# Patient Record
Sex: MALE
[De-identification: names, ages, dates, MRNs, and addresses within clinical notes are randomized per-mention and may not be internally consistent; named-entity substitution may affect disease eponyms.]

---

## 2023-11-13 ENCOUNTER — APPOINTMENT (OUTPATIENT)
Dept: UROLOGY | Facility: CLINIC | Age: 43
End: 2023-11-13
Payer: COMMERCIAL

## 2023-11-13 VITALS
HEIGHT: 71 IN | HEART RATE: 67 BPM | DIASTOLIC BLOOD PRESSURE: 70 MMHG | BODY MASS INDEX: 29.42 KG/M2 | WEIGHT: 210.13 LBS | SYSTOLIC BLOOD PRESSURE: 117 MMHG | TEMPERATURE: 97 F

## 2023-11-13 DIAGNOSIS — Z78.9 OTHER SPECIFIED HEALTH STATUS: ICD-10-CM

## 2023-11-13 DIAGNOSIS — Z80.0 FAMILY HISTORY OF MALIGNANT NEOPLASM OF DIGESTIVE ORGANS: ICD-10-CM

## 2023-11-13 DIAGNOSIS — Z83.3 FAMILY HISTORY OF DIABETES MELLITUS: ICD-10-CM

## 2023-11-13 DIAGNOSIS — Z98.890 OTHER SPECIFIED POSTPROCEDURAL STATES: ICD-10-CM

## 2023-11-13 PROBLEM — Z00.00 ENCOUNTER FOR PREVENTIVE HEALTH EXAMINATION: Status: ACTIVE | Noted: 2023-11-13

## 2023-11-13 PROCEDURE — 99204 OFFICE O/P NEW MOD 45 MIN: CPT

## 2023-12-20 ENCOUNTER — APPOINTMENT (OUTPATIENT)
Dept: UROLOGY | Facility: CLINIC | Age: 43
End: 2023-12-20
Payer: COMMERCIAL

## 2023-12-20 VITALS
WEIGHT: 201 LBS | BODY MASS INDEX: 28.14 KG/M2 | HEIGHT: 71 IN | HEART RATE: 70 BPM | SYSTOLIC BLOOD PRESSURE: 103 MMHG | DIASTOLIC BLOOD PRESSURE: 60 MMHG

## 2023-12-20 PROCEDURE — 99214 OFFICE O/P EST MOD 30 MIN: CPT

## 2023-12-20 NOTE — PHYSICAL EXAM
[General Appearance - Well Developed] : well developed [General Appearance - Well Nourished] : well nourished [Normal Appearance] : normal appearance [Well Groomed] : well groomed [General Appearance - In No Acute Distress] : no acute distress [Edema] : no peripheral edema [Respiration, Rhythm And Depth] : normal respiratory rhythm and effort [Exaggerated Use Of Accessory Muscles For Inspiration] : no accessory muscle use [Auscultation Breath Sounds / Voice Sounds] : lungs were clear to auscultation bilaterally [Abdomen Soft] : soft [Abdomen Tenderness] : non-tender [Costovertebral Angle Tenderness] : no ~M costovertebral angle tenderness [Normal Station and Gait] : the gait and station were normal for the patient's age [] : no rash [No Focal Deficits] : no focal deficits [Oriented To Time, Place, And Person] : oriented to person, place, and time [Affect] : the affect was normal [Mood] : the mood was normal [Not Anxious] : not anxious [de-identified] : Fields of View were normal

## 2023-12-20 NOTE — ASSESSMENT
[FreeTextEntry1] : His numbers are normal on the Clomid and Crestor that includes liver function tests though they did not run the lipids so I can get that answer and we just can give him another slip to get lipids.  There is a problem we will let him know  As far as fertility I do not have the full semen analysis but it was enough for intrauterine insemination and we have to wait and see if it worked.  If it did not yet be up to Dr. Alegria as to how many cycles of IUI they do before going to IVF.  I do not think there is anything much that I can do with this and to make his sperm be even more improved and the timeframe that they probably have if at all.  To summarize then He will stay on the Clomid 25 mg a day he has enough for now He will get blood test within the next couple of days to check his lipids He will get bloods in 2-1/2 and see me in 3 months Even if she gets pregnant I am recommending he not stop the Clomid.

## 2023-12-20 NOTE — LETTER BODY
[Dear  ___] : Dear  [unfilled], [Consult Letter:] : I had the pleasure of evaluating your patient, [unfilled]. [Please see my note below.] : Please see my note below. [Consult Closing:] : Thank you very much for allowing me to participate in the care of this patient.  If you have any questions, please do not hesitate to contact me. [Sincerely,] : Sincerely, [FreeTextEntry2] : Jose C Alegria MD [address] [place] [address]

## 2023-12-20 NOTE — HISTORY OF PRESENT ILLNESS
[None] : no symptoms [FreeTextEntry1] : Renzo is a 43-year-old male born April 28, 1980who I initially saw on November 13, 2023.  He gave me a history has been with Arelis a 40-year-old female born [birth year] 1983 for 1-1/2 years.  They have never used birth control and unfortunately, though she conceived, they had a miscarriage in January at 9 weeks of age.  Arelis had a follow-up partner from whom she had a pregnancy and her daughter is now 15 years old.  Renzo has not made any other women pregnant despite having prior partners without the use of birth control.  He has no prior "anatomical" urological history though about 10 years ago he was told he had a low testosterone was put on Clomid the testosterone normalized though the only numbers he remembers are the total testosterones.  There was some mornings he woke up not feeling his best so he decided to stop the Clomid.  When they tried to conceive and could not he thought Clomid might be a good idea because he read about it on the Internet so he restarted himself on Clomid ranging between 50 and 25 mg sometimes take it back-to-back sometimes every 3 to 4 days sometimes forgetting.  He saw a DrDoris  when he was in [place] who put him on 25 mg/day and he has been fairly consistent with that for about anywhere from 2 to 6 weeks depending on whom you ask.  He has not had repeat bloods done on a steady dose.  He did have lipid testing back in May which was read as elevated lipids and he was put back on Crestor and tells me he has been taking 20 mg/day for 6 months but no one checked his liver enzymes or his lipids on the medication.  I have 1 semen analysis from June 7, 2023 when he was not on the Clomid on a regular basis pH was 8 Concentration was 19 mg/mL Total sperm was 38 million Motility was 37% Forward progression was 2 Normal morphology was 28% with the abnormal forms having 4% tapered He says he has no trouble with sexual performance he can obtain and maintain an erection whenever he and his wife are in the mood  Please note shse has seen Dr. Alegria who reportedly said that her ovarian reserve seems to be intact but given her age they are not going to wait too long before going to assisted reproductive technology I did discuss with them the consideration of chromosomal abnormalities as women are over 35 and then even more over 40 and the question of which ART to use IUI versus IVF with preimplantation genetic diagnosis this is something she will discuss with him.  At the last visit his exam was acceptable.  We reviewed several issues We do not have any blood test with him off Clomid I only have 1 semen analysis so I want at least 1 more 3 months since the last 1 Arelis is 40 and though she is an "young 41" she still was 40 years old and there is always the increased risk of chromosomal abnormalities  We decided to get tests with him on the Clomid 25 mg every day for at least 3 weeks Arelis said he has been on it for 2 he said it has been on it for 6 so we just decided to wait another week and get it We will also check his liver function and his cholesterol because he has been on a statin for 6 months and no one has checked it Arelis will speak to Dr. Alegria and discuss ART  They also aware no matter what we do anything that we will change sperm usually takes 3 to 6 months to work and it is up to Dr. Alegria whether they have that time.  Finally whether they have any children he has a low testosterone needs to be treated be with Clomid or exogenous testosterone because of all the deleterious effects on your health of low testosterone  Blood test were done November 20, 2023 with him on Clomid 25 mg/day Hemoglobin was 14.7 with a platelet count of 289,000 Total testosterone was 399 Bioavailable testosterone was 223 LH was 4.2 FSH was 5.1 Estradiol was 21.8 Sex hormone binding globulin was 19  Dr. Alegria decided they should move with ART and they did an IUI and today she has only been 8 days so I have no answer.  Renzo tells me he thinks there was 16 million sperm but it does not the rest of the parameters.

## 2023-12-20 NOTE — LETTER HEADER
[FreeTextEntry3] : Simone Presley M.D. [address] [place] [address], [address] [place] [address]

## 2024-02-06 ENCOUNTER — APPOINTMENT (OUTPATIENT)
Dept: UROLOGY | Facility: CLINIC | Age: 44
End: 2024-02-06
Payer: COMMERCIAL

## 2024-02-06 PROCEDURE — G2211 COMPLEX E/M VISIT ADD ON: CPT | Mod: NC,1L

## 2024-02-06 PROCEDURE — 99215 OFFICE O/P EST HI 40 MIN: CPT

## 2024-02-06 NOTE — PHYSICAL EXAM
[General Appearance - Well Developed] : well developed [General Appearance - Well Nourished] : well nourished [Normal Appearance] : normal appearance [Well Groomed] : well groomed [General Appearance - In No Acute Distress] : no acute distress [Heart Rate And Rhythm] : heart rate and rhythm were normal [Edema] : no peripheral edema [Respiration, Rhythm And Depth] : normal respiratory rhythm and effort [Exaggerated Use Of Accessory Muscles For Inspiration] : no accessory muscle use [Auscultation Breath Sounds / Voice Sounds] : lungs were clear to auscultation bilaterally [Abdomen Soft] : soft [Abdomen Tenderness] : non-tender [Costovertebral Angle Tenderness] : no ~M costovertebral angle tenderness [Urethral Meatus] : meatus normal [Penis Abnormality] : normal circumcised penis [Testes Tenderness] : no tenderness of the testes [Normal Station and Gait] : the gait and station were normal for the patient's age [] : no rash [No Focal Deficits] : no focal deficits [Oriented To Time, Place, And Person] : oriented to person, place, and time [Affect] : the affect was normal [Mood] : the mood was normal [Not Anxious] : not anxious [Inguinal Lymph Nodes Enlarged Bilaterally] : inguinal [FreeTextEntry1] : bmi = 28 [de-identified] : leonard [de-identified] : Fields of View were normal

## 2024-02-06 NOTE — ASSESSMENT
[FreeTextEntry1] : His hormones are good his lipid support and he has a new onset right inguinal hernia that feels like bowel and was tender for almost 2 weeks  I discussed with him the possible emergencies of incarcerated hernia.  He is going to [place] tomorrow but there are doctors down in [place].  When he comes back I would ask him to see Dr. Rivera and he probably should make the appointment before he leaves.  As far as the Clomid he is doing well on it, I think I am going to keep him on it and instead of seeing him in the near future we will just get repeat bloods in early April and see him then.  As far as fertility he still has not gotten the semen analysis unfortunately IUI did not work and try again when he comes back and he will get me both semen analysis in writing.  To summarize bloods and April see me after the bloods Consult to Dr. Rivera Get me the semen analysis reports Continue the Clomid 25 mg/day that the fact that it is an off label use and the risk benefits were again discussed he had no further questions

## 2024-02-06 NOTE — LETTER HEADER
[FreeTextEntry3] : Simone Presley MD [address] [place] [address], [address] [place] [id number]

## 2024-02-06 NOTE — HISTORY OF PRESENT ILLNESS
[FreeTextEntry1] : Renzo is a 43-year-old male born April 28, 1980who I initially saw on November 13, 2023. He was last seen 12/20/2023.  He will stay on the Clomid 25 mg a day he has enough for now He will get blood test within the next couple of days to check his lipids He will get bloods in 2-1/2 and see me in 3 months Even if she gets pregnant I am recommending he not stop the Clomid.   He is here early because about 2 weeks ago he developed a bulge in his right groin that was painful it bothered him more if he was straining or walking.  At this point in time the bulge is gone away in fact the pain is gone away but he is concerned that it was a hernia that could come back and could be a problem.  He therefore came in for semiurgent appointment  As far as fertility he still take the Clomid 25 mg a day, unfortunately the IUI did not work.  The next plan is to try a second IUI I have asked him to get me the semen analysis from the last 1 and from the upcoming 1 and if got for bed that does not work they will probably go for IVF.  He still plans on keeping his appointment after he gets blood to see what the Clomid is doing for him.  He feels his libido and his erections are little bit better when he is on the Clomid and is not having any side effects.    blood test on the Clomid drawn January 16, 2020 3:24 weeks on 25 mg a day Hemoglobin was 15.6 with a platelet count of 292,000 The LDL was low at 36, triglycerides high at 195 think he will and and the LDLs were relatively high depending on which LDL was looked at these are issues he will discuss with his PCP Liver function tests were normal Total testosterone was okay at 466 but bioavailable was 343 which is near maximal presumed secondary to his Sex hormone binding globulin was only 19.5 LH was 3.8 FSH was 6.3 Estradiol was 15.8 Hemoglobin A1c was 5.5 [3] : 3 [None] : There is no radiation [Sudden] : sudden [___ Week(s) Ago] : [unfilled] week(s) ago [Intermittent] : intermittently [Mild] : mild [Improving] : improving [de-identified] : gael mckeon [de-identified] : every time he walks [de-identified] : it was a few hours now stopped bothering him [de-identified] : walking [de-identified] : advil

## 2024-03-05 ENCOUNTER — APPOINTMENT (OUTPATIENT)
Dept: SURGERY | Facility: CLINIC | Age: 44
End: 2024-03-05

## 2024-05-02 ENCOUNTER — APPOINTMENT (OUTPATIENT)
Dept: UROLOGY | Facility: CLINIC | Age: 44
End: 2024-05-02

## 2024-06-24 ENCOUNTER — APPOINTMENT (OUTPATIENT)
Dept: UROLOGY | Facility: CLINIC | Age: 44
End: 2024-06-24
Payer: COMMERCIAL

## 2024-06-24 VITALS
BODY MASS INDEX: 27.72 KG/M2 | HEART RATE: 85 BPM | OXYGEN SATURATION: 97 % | WEIGHT: 198 LBS | HEIGHT: 71 IN | DIASTOLIC BLOOD PRESSURE: 80 MMHG | SYSTOLIC BLOOD PRESSURE: 123 MMHG

## 2024-06-24 DIAGNOSIS — N46.9 MALE INFERTILITY, UNSPECIFIED: ICD-10-CM

## 2024-06-24 DIAGNOSIS — R79.89 OTHER SPECIFIED ABNORMAL FINDINGS OF BLOOD CHEMISTRY: ICD-10-CM

## 2024-06-24 DIAGNOSIS — Z87.19 PERSONAL HISTORY OF OTHER DISEASES OF THE DIGESTIVE SYSTEM: ICD-10-CM

## 2024-06-24 PROCEDURE — 99213 OFFICE O/P EST LOW 20 MIN: CPT

## 2024-06-24 PROCEDURE — G2211 COMPLEX E/M VISIT ADD ON: CPT | Mod: NC

## 2024-06-24 RX ORDER — ROSUVASTATIN CALCIUM 20 MG/1
20 TABLET, FILM COATED ORAL
Refills: 0 | Status: ACTIVE | COMMUNITY

## 2024-06-24 RX ORDER — CLOMIPHENE CITRATE 50 MG/1
TABLET ORAL
Refills: 0 | Status: ACTIVE | COMMUNITY

## 2024-07-08 ENCOUNTER — APPOINTMENT (OUTPATIENT)
Dept: UROLOGY | Facility: CLINIC | Age: 44
End: 2024-07-08
Payer: COMMERCIAL

## 2024-07-08 DIAGNOSIS — N46.9 MALE INFERTILITY, UNSPECIFIED: ICD-10-CM

## 2024-07-08 DIAGNOSIS — R79.89 OTHER SPECIFIED ABNORMAL FINDINGS OF BLOOD CHEMISTRY: ICD-10-CM

## 2024-07-08 PROCEDURE — G2211 COMPLEX E/M VISIT ADD ON: CPT | Mod: NC

## 2024-07-08 PROCEDURE — 99214 OFFICE O/P EST MOD 30 MIN: CPT

## 2024-07-08 RX ORDER — CLOMIPHENE CITRATE 100 %
POWDER (GRAM) MISCELLANEOUS
Qty: 2.25 | Refills: 0 | Status: ACTIVE | COMMUNITY
Start: 2024-07-08 | End: 1900-01-01

## 2024-10-07 ENCOUNTER — APPOINTMENT (OUTPATIENT)
Dept: UROLOGY | Facility: CLINIC | Age: 44
End: 2024-10-07

## 2024-12-06 ENCOUNTER — APPOINTMENT (OUTPATIENT)
Dept: UROLOGY | Facility: CLINIC | Age: 44
End: 2024-12-06

## 2024-12-23 ENCOUNTER — APPOINTMENT (OUTPATIENT)
Dept: UROLOGY | Facility: CLINIC | Age: 44
End: 2024-12-23

## 2025-01-27 ENCOUNTER — APPOINTMENT (OUTPATIENT)
Dept: UROLOGY | Facility: CLINIC | Age: 45
End: 2025-01-27
Payer: COMMERCIAL

## 2025-01-27 VITALS
SYSTOLIC BLOOD PRESSURE: 113 MMHG | HEIGHT: 71 IN | BODY MASS INDEX: 28.84 KG/M2 | DIASTOLIC BLOOD PRESSURE: 71 MMHG | WEIGHT: 206 LBS | HEART RATE: 82 BPM

## 2025-01-27 DIAGNOSIS — R79.89 OTHER SPECIFIED ABNORMAL FINDINGS OF BLOOD CHEMISTRY: ICD-10-CM

## 2025-01-27 DIAGNOSIS — N46.9 MALE INFERTILITY, UNSPECIFIED: ICD-10-CM

## 2025-01-27 DIAGNOSIS — E29.1 TESTICULAR HYPOFUNCTION: ICD-10-CM

## 2025-01-27 PROCEDURE — 99214 OFFICE O/P EST MOD 30 MIN: CPT

## 2025-01-27 PROCEDURE — G2211 COMPLEX E/M VISIT ADD ON: CPT | Mod: NC

## 2025-02-27 ENCOUNTER — APPOINTMENT (OUTPATIENT)
Dept: UROLOGY | Facility: CLINIC | Age: 45
End: 2025-02-27

## 2025-06-17 ENCOUNTER — NON-APPOINTMENT (OUTPATIENT)
Age: 45
End: 2025-06-17

## 2025-06-19 ENCOUNTER — APPOINTMENT (OUTPATIENT)
Dept: UROLOGY | Facility: CLINIC | Age: 45
End: 2025-06-19
Payer: COMMERCIAL

## 2025-06-19 VITALS
WEIGHT: 206 LBS | SYSTOLIC BLOOD PRESSURE: 111 MMHG | OXYGEN SATURATION: 97 % | HEART RATE: 73 BPM | TEMPERATURE: 98 F | RESPIRATION RATE: 17 BRPM | HEIGHT: 71 IN | BODY MASS INDEX: 28.84 KG/M2 | DIASTOLIC BLOOD PRESSURE: 79 MMHG

## 2025-06-19 PROCEDURE — G2211 COMPLEX E/M VISIT ADD ON: CPT | Mod: NC

## 2025-06-19 PROCEDURE — 99214 OFFICE O/P EST MOD 30 MIN: CPT

## 2025-07-03 ENCOUNTER — TRANSCRIPTION ENCOUNTER (OUTPATIENT)
Age: 45
End: 2025-07-03

## 2025-07-17 ENCOUNTER — APPOINTMENT (OUTPATIENT)
Dept: UROLOGY | Facility: CLINIC | Age: 45
End: 2025-07-17
Payer: COMMERCIAL

## 2025-07-17 PROCEDURE — G2211 COMPLEX E/M VISIT ADD ON: CPT | Mod: NC,95

## 2025-07-17 PROCEDURE — 99214 OFFICE O/P EST MOD 30 MIN: CPT | Mod: 95
